# Patient Record
Sex: FEMALE | Race: WHITE
[De-identification: names, ages, dates, MRNs, and addresses within clinical notes are randomized per-mention and may not be internally consistent; named-entity substitution may affect disease eponyms.]

---

## 2017-10-07 ENCOUNTER — HOSPITAL ENCOUNTER (EMERGENCY)
Dept: HOSPITAL 76 - ED | Age: 32
Discharge: HOME | End: 2017-10-07
Payer: COMMERCIAL

## 2017-10-07 VITALS — SYSTOLIC BLOOD PRESSURE: 128 MMHG | DIASTOLIC BLOOD PRESSURE: 70 MMHG

## 2017-10-07 DIAGNOSIS — R07.2: Primary | ICD-10-CM

## 2017-10-07 LAB
ALBUMIN/GLOB SERPL: 1.4 {RATIO} (ref 1–2.2)
ANION GAP SERPL CALCULATED.4IONS-SCNC: 9 MMOL/L (ref 6–13)
BASOPHILS NFR BLD AUTO: 0 10^3/UL (ref 0–0.1)
BASOPHILS NFR BLD AUTO: 0.5 %
BILIRUB BLD-MCNC: 0.4 MG/DL (ref 0.2–1)
BUN SERPL-MCNC: 15 MG/DL (ref 6–20)
CALCIUM UR-MCNC: 8.8 MG/DL (ref 8.5–10.3)
CHLORIDE SERPL-SCNC: 105 MMOL/L (ref 101–111)
CO2 SERPL-SCNC: 25 MMOL/L (ref 21–32)
CREAT SERPLBLD-SCNC: 0.8 MG/DL (ref 0.4–1)
EOSINOPHIL # BLD AUTO: 0.2 10^3/UL (ref 0–0.7)
EOSINOPHIL NFR BLD AUTO: 3.5 %
ERYTHROCYTE [DISTWIDTH] IN BLOOD BY AUTOMATED COUNT: 14.2 % (ref 12–15)
GFRSERPLBLD MDRD-ARVRAT: 85 ML/MIN/{1.73_M2} (ref 89–?)
GLOBULIN SER-MCNC: 2.9 G/DL (ref 2.1–4.2)
GLUCOSE SERPL-MCNC: 111 MG/DL (ref 70–100)
HCT VFR BLD AUTO: 44.4 % (ref 37–47)
HGB UR QL STRIP: 14.5 G/DL (ref 12–16)
LIPASE SERPL-CCNC: 23 U/L (ref 22–51)
LYMPHOCYTES # SPEC AUTO: 2.6 10^3/UL (ref 1.5–3.5)
LYMPHOCYTES NFR BLD AUTO: 46.6 %
MCH RBC QN AUTO: 25.4 PG (ref 27–31)
MCHC RBC AUTO-ENTMCNC: 32.7 G/DL (ref 32–36)
MCV RBC AUTO: 77.6 FL (ref 81–99)
MONOCYTES # BLD AUTO: 0.5 10^3/UL (ref 0–1)
MONOCYTES NFR BLD AUTO: 8.3 %
NEUTROPHILS # BLD AUTO: 2.3 10^3/UL (ref 1.5–6.6)
NEUTROPHILS # SNV AUTO: 5.6 X10^3/UL (ref 4.8–10.8)
NEUTROPHILS NFR BLD AUTO: 41.1 %
NRBC # BLD AUTO: 0 /100WBC
PDW BLD AUTO: 8.8 FL (ref 7.9–10.8)
POTASSIUM SERPL-SCNC: 4.2 MMOL/L (ref 3.5–5)
PROT SPEC-MCNC: 7 G/DL (ref 6.7–8.2)
RBC MAR: 5.72 10^6/UL (ref 4.2–5.4)
SODIUM SERPLBLD-SCNC: 139 MMOL/L (ref 135–145)
WBC # BLD: 5.6 X10^3/UL

## 2017-10-07 PROCEDURE — 93005 ELECTROCARDIOGRAM TRACING: CPT

## 2017-10-07 PROCEDURE — 71275 CT ANGIOGRAPHY CHEST: CPT

## 2017-10-07 PROCEDURE — 99284 EMERGENCY DEPT VISIT MOD MDM: CPT

## 2017-10-07 PROCEDURE — 83690 ASSAY OF LIPASE: CPT

## 2017-10-07 PROCEDURE — 36415 COLL VENOUS BLD VENIPUNCTURE: CPT

## 2017-10-07 PROCEDURE — 85025 COMPLETE CBC W/AUTO DIFF WBC: CPT

## 2017-10-07 PROCEDURE — 80053 COMPREHEN METABOLIC PANEL: CPT

## 2017-10-07 PROCEDURE — 71020: CPT

## 2017-10-07 PROCEDURE — 84484 ASSAY OF TROPONIN QUANT: CPT

## 2017-10-07 PROCEDURE — 96374 THER/PROPH/DIAG INJ IV PUSH: CPT

## 2017-10-07 NOTE — XRAY REPORT
EXAM:

CHEST RADIOGRAPHY

 

EXAM DATE: 10/7/2017 02:52 PM.

 

CLINICAL HISTORY: Substernal chest pain today.

 

COMPARISON: None.

 

TECHNIQUE: 2 views.

 

FINDINGS: 

Lungs/Pleura: No focal opacities evident. No pleural effusion. No pneumothorax. Normal volumes.

 

Mediastinum: Heart and mediastinal contours are unremarkable.

 

Other: None.

 

IMPRESSION: Normal 2-view chest radiography.

 

[facility]

Referring Provider Line: [phone]

 

SITE ID: [id number]

## 2017-10-07 NOTE — CT REPORT
<H1.> EXAM:

CT ANGIOGRAM CHEST AND ABDOMEN

 

EXAM DATE: 10/7/2017 03:46 PM.

 

CLINICAL HISTORY: Substernal chest pain, severe.

 

COMPARISONS: None.

 

TECHNIQUE: 

Routine axial helical CT angiographic imaging was performed through the chest and abdomen. IV Contras
t: 80 cc of Isovue 300. Reconstructions: Coronal, sagittal, and 3D MIP reconstructions of the aorta.

 

In accordance with CT protocol optimization, one or more of the following dose reduction techniques w
ere utilized for this exam: automated exposure control, adjustment of mA and/or KV based on patient s
ize, or use of iterative reconstructive technique.

 

FINDINGS: 

Vascular Structures: Normal. No aneurysm, dissection, or significant atherosclerotic disease of the t
horacic aorta, abdominal aorta, or proximal iliac arteries. The visualized pulmonary, mesenteric, and
 solid organ vascular structures are also within normal limits.

 

Lungs/Pleura: No consolidation, nodules, or edema. No effusions or pneumothorax.

 

Mediastinum: Normal. No cardiac enlargement or adenopathy.

 

Abdominal Organs: Normal. The liver, spleen, pancreas, adrenal glands, gallbladder and kidneys are no
rmal in size and demonstrate no masses or abnormal enhancement.

 

Peritoneal Cavity: Normal. No free fluid, free air, or acute inflammatory process.

 

Bones: No significant abnormality.

 

Other: None.

 

IMPRESSION: 

Normal chest and abdomen CT angiogram. No aneurysm or dissection.

 

[facility]

Referring Provider Line: [phone]

 

SITE ID: [id number]

## 2017-10-07 NOTE — XRAY PRELIMINARY REPORT
Accession: [id number]

Exam: XR Chest 2 View PA/LAT

 

IMPRESSION: Normal 2-view chest radiography.

 

[facility]

 

SITE ID: [id number]

## 2017-10-07 NOTE — ED PHYSICIAN DOCUMENTATION
PD HPI CHEST PAIN





- Stated complaint


Stated Complaint: CHEST PX, VOMITTING





- Chief complaint


Chief Complaint: Cardiac





- History obtained from


History obtained from: Patient





- History of Present Illness


Timing - onset: Today


Timing - onset during: Light activity


Timing - details: Abrupt onset, Still present, Still present in ED


Quality: Sharp, Tearing, Pain


Location: Substernal


Radiation: Back


Improved by: No: Rest


Worsened by: Inspiration, Movement.  No: Eating, Palpation, Position


Associated symptoms: Feeling faint / dizzy.  No: Shortness of air, Nausea, 

Vomiting, Palpitations, Cough


Similar symptoms before: Has not had sx before


Recently seen: Not recently seen





Review of Systems


Constitutional: denies: Fever, Chills


Nose: denies: Rhinorrhea / runny nose, Congestion


Throat: denies: Sore throat


Cardiac: denies: Palpitations, Pedal edema, Calf pain


Respiratory: denies: Cough


GI: denies: Abdominal Pain, Nausea, Vomiting, Diarrhea


: denies: Dysuria, Frequency


Skin: denies: Rash, Lesions


Musculoskeletal: denies: Joint pain, Extremity swelling


Neurologic: denies: Near syncope, Syncope, Altered mental status





PD PAST MEDICAL HISTORY





- Past Medical History


Cardiovascular: None


Respiratory: None


Neuro: None


Endocrine/Autoimmune: None


GI: GERD


GYN: Miscarriage(s)


: Chronic bladder infection


HEENT: None


Psych: None


Musculoskeletal: None


Derm: None





- Past Surgical History


Past Surgical History: Yes


/GYN:  section





- Present Medications


Home Medications: 


 Ambulatory Orders











 Medication  Instructions  Recorded  Confirmed


 


Famotidine [Pepcid] 20 mg PO ONCE #20 tablet 10/07/17 


 


Multivitamin [Multivitamins] 1 cap PO DAILY 10/07/17 10/07/17


 


Naproxen [Naprosyn] 500 mg PO BID #15 tablet 10/07/17 


 


Oxycodone HCl/Acetaminophen 1 each PO Q6H PRN #12 tablet 10/07/17 





[Percocet 5-325 mg Tablet]   














- Allergies


Allergies/Adverse Reactions: 


 Allergies











Allergy/AdvReac Type Severity Reaction Status Date / Time


 


clarithromycin [From Biaxin] Allergy Severe Itching Verified 10/07/17 13:54


 


erythromycin base Allergy Severe Rash Verified 10/07/17 13:54





[Erythromycin Base]     


 


hydrocodone bitartrate * Allergy Severe Itching Verified 10/07/17 13:54





[From Vicodin]     


 


levofloxacin [From Levaquin] Allergy Severe Rash Verified 10/07/17 13:54


 


Penicillins Allergy Severe Hives Verified 10/07/17 13:54


 


Sulfa (Sulfonamide Allergy Severe Itching Verified 10/07/17 13:54





Antibiotics)     














- Social History


Does the pt smoke?: No


Smoking Status: Never smoker


Does the pt drink ETOH?: Yes


Does the pt have substance abuse?: No





- Family History


Family history: denies: Venous thromboembolism, Aortic aneursym, Aortic 

dissection





- Immunizations


Immunizations are current?: Yes





- POLST


Patient has POLST: No





PD ED PE NORMAL





- Vitals


Vital signs reviewed: Yes





- General


General: Alert and oriented X 3, Well developed/nourished, Other (appears in 

significant pain, with intermittent stabbinb pain substernally.)





- HEENT


HEENT: Moist mucous membranes, Pharynx benign





- Neck


Neck: Supple, no meningeal sign, No adenopathy





- Cardiac


Cardiac: RRR, No murmur





- Respiratory


Respiratory: No respiratory distress, Clear bilaterally





- Abdomen


Abdomen: Normal bowel sounds, Soft, Non tender, Non distended





- Back


Back: No CVA TTP





- Derm


Derm: Normal color, Warm and dry





- Extremities


Extremities: No tenderness to palpate, Normal ROM s pain, No edema, No calf 

tenderness / cord





- Neuro


Neuro: Alert and oriented X 3, No motor deficit, Normal speech





- Psych


Psych: Normal mood, Normal affect





Results





- Vitals


Vitals: 


 Vital Signs - 24 hr











  10/07/17 10/07/17 10/07/17





  13:50 15:23 16:30


 


Temperature 36.5 C 36.5 C 36.4 C L


 


Heart Rate 97 74 76


 


Respiratory 20 16 12





Rate   


 


Blood Pressure 132/80 H 109/71 123/78


 


O2 Saturation 99 98 98














  10/07/17 10/07/17





  16:38 17:20


 


Temperature 36.3 C L 


 


Heart Rate 73 75


 


Respiratory 16 16





Rate  


 


Blood Pressure 122/67 128/70


 


O2 Saturation 100 98








 Oxygen











O2 Source                      Room air

















- Labs


Labs: 


 Laboratory Tests











  10/07/17 10/07/17 10/07/17





  15:11 15:11 15:11


 


WBC  5.6  


 


RBC  5.72 H  


 


Hgb  14.5  


 


Hct  44.4  


 


MCV  77.6 L  


 


MCH  25.4 L  


 


MCHC  32.7  


 


RDW  14.2  


 


Plt Count  169  


 


MPV  8.8  


 


Neut #  2.3  


 


Lymph #  2.6  


 


Mono #  0.5  


 


Eos #  0.2  


 


Baso #  0.0  


 


Absolute Nucleated RBC  0.00  


 


Nucleated RBC %  0.0  


 


Sodium   139 


 


Potassium   4.2 


 


Chloride   105 


 


Carbon Dioxide   25 


 


Anion Gap   9.0 


 


BUN   15 


 


Creatinine   0.8 


 


Estimated GFR (MDRD)   85 L 


 


Glucose   111 H 


 


Calcium   8.8 


 


Total Bilirubin   0.4 


 


AST   18 


 


ALT   24 


 


Alkaline Phosphatase   64 


 


Troponin I    < 0.04


 


Total Protein   7.0 


 


Albumin   4.1 


 


Globulin   2.9 


 


Albumin/Globulin Ratio   1.4 


 


Lipase   23 














- Rads (name of study)


  ** chest xray


Radiology: Prelim report reviewed (normal)





  ** chest CT-A


Radiology: Prelim report reviewed (negative)





PD MEDICAL DECISION MAKING





- ED course


Complexity details: considered differential (such severe substernal pain, sharp 

and stabbing, not improved with GI cocktail. Normal ECG and CXR. Concern for 

vascular such as aortic dissection, that got CT angio chest, which is read as 

normal by Radiologist. ), d/w patient





Departure





- Departure


Disposition: 01 Home, Self Care


Clinical Impression: 


 Substernal chest pain





Condition: Stable


Record reviewed to determine appropriate education?: Yes


Instructions:  ED Chest Pain Atypical Unkn Cause


Follow-Up: 


ALDO GOINS MD [Primary Care Provider] - 


Prescriptions: 


Famotidine [Pepcid] 20 mg PO ONCE #20 tablet


Naproxen [Naprosyn] 500 mg PO BID #15 tablet


Oxycodone HCl/Acetaminophen [Percocet 5-325 mg Tablet] 1 each PO Q6H PRN #12 

tablet


 PRN Reason: Pain


Comments: 


Drink lots of fluids.  I do not know the cause of the pain exactly.  I presume 

its inflammatory given the better response to Toradol which is an anti-

inflammatory than it did to the antacid.  However he would still do some acid 

reducing medicine for the stomach in case and suggest famotidine daily for 

couple weeks.  Additionally I would use some ibuprofen or naproxen twice daily 

for the next week for inflammation.  Add Tylenol for mild pain.  Add Percocet 

if needed for worse pain but very short-term only.  Recheck if not entirely 

improved over the next 3-4 days.  Return sooner if worse or other symptoms.


Discharge Date/Time: 10/07/17 17:22

## 2017-10-07 NOTE — CT PRELIMINARY REPORT
Accession: [id number]

Exam: CT Chest Angio (AORTA)

 

IMPRESSION: 

Normal chest and abdomen CT angiogram. No aneurysm or dissection.

 

[facility]

 

SITE ID: [id number]

## 2019-03-06 ENCOUNTER — HOSPITAL ENCOUNTER (EMERGENCY)
Dept: HOSPITAL 76 - ED | Age: 34
Discharge: HOME | End: 2019-03-06
Payer: COMMERCIAL

## 2019-03-06 VITALS — SYSTOLIC BLOOD PRESSURE: 140 MMHG | DIASTOLIC BLOOD PRESSURE: 88 MMHG

## 2019-03-06 DIAGNOSIS — J38.3: ICD-10-CM

## 2019-03-06 DIAGNOSIS — R03.0: ICD-10-CM

## 2019-03-06 DIAGNOSIS — G43.111: Primary | ICD-10-CM

## 2019-03-06 LAB
ALBUMIN DIAFP-MCNC: 3.9 G/DL (ref 3.2–5.5)
ALBUMIN/GLOB SERPL: 1.3 {RATIO} (ref 1–2.2)
ALP SERPL-CCNC: 69 IU/L (ref 42–121)
ALT SERPL W P-5'-P-CCNC: 25 IU/L (ref 10–60)
ANION GAP SERPL CALCULATED.4IONS-SCNC: 7 MMOL/L (ref 6–13)
AST SERPL W P-5'-P-CCNC: 16 IU/L (ref 10–42)
BASOPHILS NFR BLD AUTO: 0 10^3/UL (ref 0–0.1)
BASOPHILS NFR BLD AUTO: 0.6 %
BILIRUB BLD-MCNC: 0.4 MG/DL (ref 0.2–1)
BUN SERPL-MCNC: 12 MG/DL (ref 6–20)
CALCIUM UR-MCNC: 8.6 MG/DL (ref 8.5–10.3)
CHLORIDE SERPL-SCNC: 105 MMOL/L (ref 101–111)
CO2 SERPL-SCNC: 25 MMOL/L (ref 21–32)
CREAT SERPLBLD-SCNC: 0.8 MG/DL (ref 0.4–1)
EOSINOPHIL # BLD AUTO: 0.3 10^3/UL (ref 0–0.7)
EOSINOPHIL NFR BLD AUTO: 4.2 %
ERYTHROCYTE [DISTWIDTH] IN BLOOD BY AUTOMATED COUNT: 14.7 % (ref 12–15)
GFRSERPLBLD MDRD-ARVRAT: 84 ML/MIN/{1.73_M2} (ref 89–?)
GLOBULIN SER-MCNC: 3 G/DL (ref 2.1–4.2)
GLUCOSE SERPL-MCNC: 98 MG/DL (ref 70–100)
HGB UR QL STRIP: 14.2 G/DL (ref 12–16)
LIPASE SERPL-CCNC: 28 U/L (ref 22–51)
LYMPHOCYTES # SPEC AUTO: 2.7 10^3/UL (ref 1.5–3.5)
LYMPHOCYTES NFR BLD AUTO: 45.9 %
MCH RBC QN AUTO: 25.6 PG (ref 27–31)
MCHC RBC AUTO-ENTMCNC: 32.8 G/DL (ref 32–36)
MCV RBC AUTO: 77.9 FL (ref 81–99)
MONOCYTES # BLD AUTO: 0.7 10^3/UL (ref 0–1)
MONOCYTES NFR BLD AUTO: 11.7 %
NEUTROPHILS # BLD AUTO: 2.2 10^3/UL (ref 1.5–6.6)
NEUTROPHILS # SNV AUTO: 5.9 X10^3/UL (ref 4.8–10.8)
NEUTROPHILS NFR BLD AUTO: 37.6 %
PDW BLD AUTO: 8.9 FL (ref 7.9–10.8)
PLATELET # BLD: 206 10^3/UL (ref 130–450)
PROT SPEC-MCNC: 6.9 G/DL (ref 6.7–8.2)
RBC MAR: 5.55 10^6/UL (ref 4.2–5.4)
SODIUM SERPLBLD-SCNC: 137 MMOL/L (ref 135–145)

## 2019-03-06 PROCEDURE — 83690 ASSAY OF LIPASE: CPT

## 2019-03-06 PROCEDURE — 80053 COMPREHEN METABOLIC PANEL: CPT

## 2019-03-06 PROCEDURE — 96374 THER/PROPH/DIAG INJ IV PUSH: CPT

## 2019-03-06 PROCEDURE — 36415 COLL VENOUS BLD VENIPUNCTURE: CPT

## 2019-03-06 PROCEDURE — 99283 EMERGENCY DEPT VISIT LOW MDM: CPT

## 2019-03-06 PROCEDURE — 85025 COMPLETE CBC W/AUTO DIFF WBC: CPT

## 2019-03-06 PROCEDURE — 84484 ASSAY OF TROPONIN QUANT: CPT

## 2019-03-06 PROCEDURE — 93005 ELECTROCARDIOGRAM TRACING: CPT

## 2019-03-06 NOTE — ED PHYSICIAN DOCUMENTATION
PD HPI FOCAL NEURO





- Stated complaint


Stated Complaint: HEADACHE/SOA/LT ARM PX





- Chief complaint


Chief Complaint: Neuro





- History obtained from


History obtained from: Patient





- History of Present Illness


Timing - onset: Other (This is a 33-year-old woman with history of anxiety and 

depression.  She recently stopped Cymbalta about a week and a half ago because 

of side effects.  She denies suicidal or homicidal ideation but presents today 

with multiple complaints including chronic migraine headache with aura since 

yesterday seeing black spots in her eyes.  She also has increased chest and neck

pain similar to prior episodes of vocal cord dysfunction.  She denies any 

possibility of pregnancy.  No alcohol or drug use.  She is active duty in the 

[employer].)





Review of Systems


Constitutional: denies: Fever, Chills


Throat: denies: Dental pain / toothache, Sore throat


Cardiac: reports: Chest pain / pressure.  denies: Palpitations, Pedal edema, 

Calf pain


Respiratory: denies: Dyspnea





PD PAST MEDICAL HISTORY





- Past Medical History


Cardiovascular: None


Respiratory: None


Endocrine/Autoimmune: None


GI: GERD


GYN: Miscarriage(s)


: Chronic bladder infection


HEENT: None


Psych: None, Depression, Anxiety


Musculoskeletal: None


Derm: None





- Past Surgical History


Past Surgical History: Yes


/GYN:  section





- Present Medications


Home Medications: 


                                Ambulatory Orders











 Medication  Instructions  Recorded  Confirmed


 


Famotidine [Pepcid] 20 mg PO ONCE #20 tablet 10/07/17 


 


Multivitamin [Multivitamins] 1 cap PO DAILY 10/07/17 10/07/17


 


Naproxen [Naprosyn] 500 mg PO BID #15 tablet 10/07/17 


 


Oxycodone HCl/Acetaminophen 1 each PO Q6H PRN #12 tablet 10/07/17 





[Percocet 5-325 mg Tablet]   


 


Amitriptyline [Elavil] 25 mg PO HS #7 tablet 19 














- Allergies


Allergies/Adverse Reactions: 


                                    Allergies











Allergy/AdvReac Type Severity Reaction Status Date / Time


 


clarithromycin [From Biaxin] Allergy Severe Itching Verified 19 18:17


 


erythromycin base Allergy Severe Rash Verified 19 18:17





[Erythromycin Base]     


 


hydrocodone bitartrate * Allergy Severe Itching Verified 19 18:17





[From Vicodin]     


 


levofloxacin [From Levaquin] Allergy Severe Rash Verified 19 18:17


 


Penicillins Allergy Severe Hives Verified 19 18:17


 


Sulfa (Sulfonamide Allergy Severe Itching Verified 19 18:17





Antibiotics)     














- Social History


Does the pt smoke?: No


Smoking Status: Never smoker


Does the pt drink ETOH?: Yes


Does the pt have substance abuse?: No





- Immunizations


Immunizations are current?: Yes





- POLST


Patient has POLST: No





PD ED PE NORMAL





- Vitals


Vital signs reviewed: Yes





- General


General: Alert and oriented X 3, No acute distress, Other (She is tearful at 

times)





- HEENT


HEENT: PERRL, EOMI





- Neck


Neck: Supple, no meningeal sign, No bony TTP





- Cardiac


Cardiac: RRR, No murmur, Other (Tender anterior sternum and chest wall)





- Respiratory


Respiratory: No respiratory distress, Clear bilaterally





- Abdomen


Abdomen: Normal bowel sounds, Soft, Non tender





- Back


Back: No CVA TTP, No spinal TTP





- Derm


Derm: Normal color, Warm and dry





- Extremities


Extremities: No edema, No calf tenderness / cord





- Neuro


Neuro: Alert and oriented X 3, Normal speech





Results





- Vitals


Vitals: 


                               Vital Signs - 24 hr











  19





  18:13


 


Temperature 36.6 C


 


Heart Rate 73


 


Respiratory 14





Rate 


 


Blood Pressure 140/88 H


 


O2 Saturation 100








                                     Oxygen











O2 Source                      Room air

















- EKG (time done)


  ** 1829


Rate: Rate (enter#) (83)


Rhythm: NSR


Axis: Normal


Intervals: Normal OH


QRS: Normal


Ischemia: ST elevation c/w repol


Computer interpretation: Agree with computer





- Labs


Labs: 


                                Laboratory Tests











  19





  19:08 19:08 19:08


 


WBC  5.9  


 


RBC  5.55 H  


 


Hgb  14.2  


 


Hct  43.2  


 


MCV  77.9 L  


 


MCH  25.6 L  


 


MCHC  32.8  


 


RDW  14.7  


 


Plt Count  206  


 


MPV  8.9  


 


Neut # (Auto)  2.2  


 


Lymph # (Auto)  2.7  


 


Mono # (Auto)  0.7  


 


Eos # (Auto)  0.3  


 


Baso # (Auto)  0.0  


 


Absolute Nucleated RBC  0.00  


 


Nucleated RBC %  0.0  


 


Sodium   137 


 


Potassium   3.6 


 


Chloride   105 


 


Carbon Dioxide   25 


 


Anion Gap   7.0 


 


BUN   12 


 


Creatinine   0.8 


 


Estimated GFR (MDRD)   84 L 


 


Glucose   98 


 


Calcium   8.6 


 


Total Bilirubin   0.4 


 


AST   16 


 


ALT   25 


 


Alkaline Phosphatase   69 


 


Troponin I    < 0.04


 


Total Protein   6.9 


 


Albumin   3.9 


 


Globulin   3.0 


 


Albumin/Globulin Ratio   1.3 


 


Lipase   28 














PD MEDICAL DECISION MAKING





- ED course


ED course: 





33-year-old woman presents with status migrainosus and vocal cord dysfunction 

for which she was treated with Toradol Reglan and Ativan with excellent relief. 

Diagnostics were negative.  We will trial a low-dose of amitriptyline at night, 

a limited prescription and she will follow-up with her doctor to report if it is

helping with her migraine frequency and/or depression.





Departure





- Departure


Disposition:  Home, Self Care


Clinical Impression: 


Migraine


Qualifiers:


 Migraine type: with aura Status migrainosus presence: with status migrainosus 

Intractability: intractable Qualified Code(s): G43.111 - Migraine with aura, 

intractable, with status migrainosus





Condition: Good


Record reviewed to determine appropriate education?: Yes


Instructions:  ED Headache Migraine


Prescriptions: 


Amitriptyline [Elavil] 25 mg PO HS #7 tablet


Comments: 


The amitriptyline should help with both depression and migraine frequency.  

Report to your doctor early next week if it is helpful for refill.





Your blood pressure was elevated today on check into the emergency department.  

This does not mean that you have hypertension, it is a common phenomenon to come

 to the emergency department and have elevated blood pressure.  I recommend that

 you see your primary care physician within the week to have it rechecked when 

you are feeling better.

## 2020-02-13 ENCOUNTER — HOSPITAL ENCOUNTER (EMERGENCY)
Dept: HOSPITAL 76 - ED | Age: 35
Discharge: HOME | End: 2020-02-13
Payer: COMMERCIAL

## 2020-02-13 VITALS — DIASTOLIC BLOOD PRESSURE: 71 MMHG | SYSTOLIC BLOOD PRESSURE: 119 MMHG

## 2020-02-13 DIAGNOSIS — H66.001: ICD-10-CM

## 2020-02-13 DIAGNOSIS — R20.3: Primary | ICD-10-CM

## 2020-02-13 PROCEDURE — 99284 EMERGENCY DEPT VISIT MOD MDM: CPT

## 2020-02-13 PROCEDURE — 99283 EMERGENCY DEPT VISIT LOW MDM: CPT

## 2020-02-13 PROCEDURE — 96372 THER/PROPH/DIAG INJ SC/IM: CPT

## 2020-02-13 NOTE — ED PHYSICIAN DOCUMENTATION
History of Present Illness





- Stated complaint


Stated Complaint: AB/RIB PX





- Chief complaint


Chief Complaint: Trauma Ext





- History obtained from


History obtained from: Patient





- History of Present Illness


Timing: How many weeks ago (8)





- Additonal information


Additional information: 





32-year-old female with a history of anxiety depression arthritis and migraine h

veda has generalized pain and pain in her ribs the pain down her left arm 

and left neck.  She is wearing a sling for the left arm.  She has had 

manipulations of her ribs previously done which has previously helped this last 

time it did not seem to help and she is complaining of pain in her anterior 

right ribs pain in her hands pain in her hips pain over her right upper quadrant

of her abdomen pain in her neck and back.  She has had some congestion and a 

high-pitched noise in her ears as well as some dizziness.She has had low-grade 

fever and cough.





Review of Systems


Constitutional: reports: Fever


Eyes: denies: Decreased vision


Ears: reports: Ear pain, Tinnitus/ringing


Nose: reports: Congestion


Throat: denies: Sore throat


Cardiac: reports: Chest pain / pressure.  denies: Palpitations, Pedal edema, 

Calf pain


Respiratory: reports: Cough.  denies: Dyspnea


GI: reports: Nausea.  denies: Vomiting


: denies: Dysuria, Frequency





PD PAST MEDICAL HISTORY





- Past Medical History


Cardiovascular: None


Respiratory: None


Endocrine/Autoimmune: None


GI: GERD


GYN: Miscarriage(s)


: Chronic bladder infection


HEENT: None


Psych: None, Depression, Anxiety


Musculoskeletal: None


Derm: None





- Past Surgical History


Past Surgical History: Yes


/GYN:  section





- Present Medications


Home Medications: 


                                Ambulatory Orders











 Medication  Instructions  Recorded  Confirmed


 


Famotidine [Pepcid] 20 mg PO ONCE #20 tablet 10/07/17 


 


Multivitamin [Multivitamins] 1 cap PO DAILY 10/07/17 10/07/17


 


Naproxen [Naprosyn] 500 mg PO BID #15 tablet 10/07/17 


 


Oxycodone HCl/Acetaminophen 1 each PO Q6H PRN #12 tablet 10/07/17 





[Percocet 5-325 mg Tablet]   


 


Amitriptyline [Elavil] 25 mg PO HS #7 tablet 19 


 


Cefdinir 300 mg PO BID #20 capsule 20 














- Allergies


Allergies/Adverse Reactions: 


                                    Allergies











Allergy/AdvReac Type Severity Reaction Status Date / Time


 


clarithromycin [From Biaxin] Allergy Severe Itching Verified 19 18:17


 


erythromycin base Allergy Severe Rash Verified 19 18:17





[Erythromycin Base]     


 


hydrocodone bitartrate * Allergy Severe Itching Verified 19 18:17





[From Vicodin]     


 


levofloxacin [From Levaquin] Allergy Severe Rash Verified 19 18:17


 


Penicillins Allergy Severe Hives Verified 19 18:17


 


Sulfa (Sulfonamide Allergy Severe Itching Verified 19 18:17





Antibiotics)     














- Social History


Does the pt smoke?: No


Smoking Status: Never smoker


Does the pt drink ETOH?: Yes


Does the pt have substance abuse?: No





- Immunizations


Immunizations are current?: Yes





- POLST


Patient has POLST: No





PD ED PE NORMAL





- Vitals


Vital signs reviewed: Yes (hypertensive mild )





- General


General: Alert and oriented X 3, No acute distress, Well developed/nourished





- HEENT


HEENT: Atraumatic, PERRL, EOMI, Pharynx benign, Other (right TM is inflamed 

angrily along the umbo the left is clear )





- Neck


Neck: Supple, no meningeal sign, No bony TTP





- Cardiac


Cardiac: RRR, No murmur





- Respiratory


Respiratory: No respiratory distress, Clear bilaterally, Other (There is 

tenderness to the anterior chest wall along the costosternal margin. )





- Abdomen


Abdomen: Soft, Other (Right upper quadrant tenderness worse with a deep breath.)





- Back


Back: No CVA TTP, No spinal TTP





- Derm


Derm: Normal color, No rash





- Extremities


Extremities: No deformity, No edema, No calf tenderness / cord





- Neuro


Neuro: Alert and oriented X 3, CNs 2-12 intact, No motor deficit, No sensory 

deficit, Normal speech


Eye Opening: Spontaneous


Motor: Obeys Commands


Verbal: Oriented


GCS Score: 15





- Psych


Psych: Normal mood, Normal affect





Results





- Vitals


Vitals: 


                               Vital Signs - 24 hr











  20





  15:50


 


Temperature 37.1 C


 


Heart Rate 95


 


Respiratory 18





Rate 


 


Blood Pressure 135/71 H


 


O2 Saturation 97








                                     Oxygen











O2 Source                      Room air

















PD MEDICAL DECISION MAKING





- ED course


Complexity details: considered differential, d/w patient


ED course: 





34-year-old female with allodynia and some undulating respiratory illness.  She 

does have otitis on exam.  She is administered dexamethasone 10 mg orally 

Toradol 60 mg IM and we will treat her otitis with antibiotic as well as I 

suspect this may be contributing to her increased overall pain.  I do not have a

solution for the patient's allodynia otherwise. I have discussed with her 

potential follow up with rheumatology as her pain is wide spread, involves 

joints and is persistent. She will need a referral. 





Departure





- Departure


Disposition: 01 Home, Self Care


Clinical Impression: 


 Allodynia





Otitis media


Qualifiers:


 Otitis media type: suppurative Chronicity: acute Laterality: right Recurrence: 

non-recurrent Spontaneous tympanic membrane rupture: without spontaneous rupture

Qualified Code(s): H66.001 - Acute suppurative otitis media without spontaneous 

rupture of ear drum, right ear





Condition: Stable


Instructions:  ED Otitis Media Acute Adult, ED Joint Pain, ED Chest Pain 

Costochondritis


Follow-Up: 


LALITHA BLUNT MD [Primary Care Provider] - 


Prescriptions: 


Cefdinir 300 mg PO BID #20 capsule


Comments: 


Today your wide spread pain is concerning for a rheumatologic problem and a fo

llow up with a rheumatologist might be helpful.

## 2020-09-06 ENCOUNTER — HOSPITAL ENCOUNTER (EMERGENCY)
Dept: HOSPITAL 76 - ED | Age: 35
LOS: 1 days | Discharge: HOME | End: 2020-09-07
Payer: COMMERCIAL

## 2020-09-06 DIAGNOSIS — R07.9: Primary | ICD-10-CM

## 2020-09-06 LAB
ALBUMIN DIAFP-MCNC: 4.1 G/DL (ref 3.2–5.5)
ALBUMIN/GLOB SERPL: 1.6 {RATIO} (ref 1–2.2)
ALP SERPL-CCNC: 70 IU/L (ref 42–121)
ALT SERPL W P-5'-P-CCNC: 23 IU/L (ref 10–60)
ANION GAP SERPL CALCULATED.4IONS-SCNC: 7 MMOL/L (ref 6–13)
AST SERPL W P-5'-P-CCNC: 14 IU/L (ref 10–42)
BASOPHILS NFR BLD AUTO: 0 10^3/UL (ref 0–0.1)
BASOPHILS NFR BLD AUTO: 0.3 %
BILIRUB BLD-MCNC: 0.4 MG/DL (ref 0.2–1)
BUN SERPL-MCNC: 16 MG/DL (ref 6–20)
CALCIUM UR-MCNC: 8.8 MG/DL (ref 8.5–10.3)
CHLORIDE SERPL-SCNC: 103 MMOL/L (ref 101–111)
CO2 SERPL-SCNC: 25 MMOL/L (ref 21–32)
CREAT SERPLBLD-SCNC: 0.9 MG/DL (ref 0.4–1)
EOSINOPHIL # BLD AUTO: 0.4 10^3/UL (ref 0–0.7)
EOSINOPHIL NFR BLD AUTO: 4.5 %
ERYTHROCYTE [DISTWIDTH] IN BLOOD BY AUTOMATED COUNT: 14.4 % (ref 12–15)
GLOBULIN SER-MCNC: 2.6 G/DL (ref 2.1–4.2)
GLUCOSE SERPL-MCNC: 103 MG/DL (ref 70–100)
HGB UR QL STRIP: 13.8 G/DL (ref 12–16)
LIPASE SERPL-CCNC: 25 U/L (ref 22–51)
LYMPHOCYTES # SPEC AUTO: 3.7 10^3/UL (ref 1.5–3.5)
LYMPHOCYTES NFR BLD AUTO: 38.6 %
MCH RBC QN AUTO: 25.8 PG (ref 27–31)
MCHC RBC AUTO-ENTMCNC: 32.1 G/DL (ref 32–36)
MCV RBC AUTO: 80.5 FL (ref 81–99)
MONOCYTES # BLD AUTO: 0.8 10^3/UL (ref 0–1)
MONOCYTES NFR BLD AUTO: 7.9 %
NEUTROPHILS # BLD AUTO: 4.7 10^3/UL (ref 1.5–6.6)
NEUTROPHILS # SNV AUTO: 9.6 X10^3/UL (ref 4.8–10.8)
NEUTROPHILS NFR BLD AUTO: 48.3 %
PDW BLD AUTO: 10.9 FL (ref 7.9–10.8)
PLATELET # BLD: 241 10^3/UL (ref 130–450)
PROT SPEC-MCNC: 6.7 G/DL (ref 6.7–8.2)
RBC MAR: 5.34 10^6/UL (ref 4.2–5.4)
SODIUM SERPLBLD-SCNC: 135 MMOL/L (ref 135–145)

## 2020-09-06 PROCEDURE — 84484 ASSAY OF TROPONIN QUANT: CPT

## 2020-09-06 PROCEDURE — 83690 ASSAY OF LIPASE: CPT

## 2020-09-06 PROCEDURE — 93971 EXTREMITY STUDY: CPT

## 2020-09-06 PROCEDURE — 36415 COLL VENOUS BLD VENIPUNCTURE: CPT

## 2020-09-06 PROCEDURE — 85025 COMPLETE CBC W/AUTO DIFF WBC: CPT

## 2020-09-06 PROCEDURE — 93005 ELECTROCARDIOGRAM TRACING: CPT

## 2020-09-06 PROCEDURE — 80053 COMPREHEN METABOLIC PANEL: CPT

## 2020-09-06 PROCEDURE — 96374 THER/PROPH/DIAG INJ IV PUSH: CPT

## 2020-09-06 PROCEDURE — 71275 CT ANGIOGRAPHY CHEST: CPT

## 2020-09-06 PROCEDURE — 99284 EMERGENCY DEPT VISIT MOD MDM: CPT

## 2020-09-06 NOTE — ED PHYSICIAN DOCUMENTATION
PD HPI DYSPNEA





- Stated complaint


Stated Complaint: SOA, FOOT SWELLING





- Chief complaint


Chief Complaint: Cardiac





- History obtained from


History obtained from: Patient





- History of Present Illness


Timing - onset: How many days ago (3)


Timing - details: Gradual onset, Constant, Waxing and waning


Pain level now: 3


Improved by: Rest


Worsened by: Exertion


Associated symptoms: Chest pain / discomfort, Unilateral edema.  No: Fever, 

Cough, Hemoptysis, Wheezing, Palpitations, Diaphoresis, Bilateral edema


Similar symptoms before: Has not had sx before


Recently seen: Not recently seen





- Additional information


Additional information: 





c/o 3 days of chest pressure/tightness with intermittent component of stabbing 

pain, associated with dyspnea. She also notes LLE swelling for several weeks.





Review of Systems


Constitutional: denies: Fever, Chills, Sweats


Cardiac: reports: Chest pain / pressure, Pedal edema.  denies: Palpitations, 

Calf pain


Respiratory: reports: Dyspnea.  denies: Cough, Hemoptysis, Wheezing


GI: reports: Reviewed and negative


: denies: Dysuria, Frequency, Now pregnant EGA


Musculoskeletal: reports: Extremity swelling.  denies: Extremity pain





PD PAST MEDICAL HISTORY





- Past Medical History


Cardiovascular: None


Respiratory: None


Endocrine/Autoimmune: None


GI: GERD


GYN: Miscarriage(s)


: Chronic bladder infection


HEENT: None


Psych: None, Depression, Anxiety


Musculoskeletal: None


Derm: None





- Past Surgical History


Past Surgical History: Yes


/GYN:  section





- Present Medications


Home Medications: 


                                Ambulatory Orders











 Medication  Instructions  Recorded  Confirmed


 


Famotidine [Pepcid] 20 mg PO ONCE #20 tablet 10/07/17 


 


Multivitamin [Multivitamins] 1 cap PO DAILY 10/07/17 10/07/17


 


Naproxen [Naprosyn] 500 mg PO BID #15 tablet 10/07/17 


 


Oxycodone HCl/Acetaminophen 1 each PO Q6H PRN #12 tablet 10/07/17 





[Percocet 5-325 mg Tablet]   


 


Amitriptyline [Elavil] 25 mg PO HS #7 tablet 19 


 


Cefdinir 300 mg PO BID #20 capsule 20 


 


traMADol [Ultram] 50 - 100 mg PO Q6H PRN #20 tablet 20 














- Allergies


Allergies/Adverse Reactions: 


                                    Allergies











Allergy/AdvReac Type Severity Reaction Status Date / Time


 


clarithromycin [From Biaxin] Allergy Severe Itching Verified 20 22:26


 


erythromycin base Allergy Severe Rash Verified 20 22:26





[Erythromycin Base]     


 


hydrocodone bitartrate * Allergy Severe Itching Verified 20 22:26





[From Vicodin]     


 


levofloxacin [From Levaquin] Allergy Severe Rash Verified 20 22:26


 


Penicillins Allergy Severe Hives Verified 20 22:26


 


Sulfa (Sulfonamide Allergy Severe Itching Verified 20 22:26





Antibiotics)     














- Social History


Does the pt smoke?: No


Smoking Status: Never smoker


Does the pt drink ETOH?: Yes


Does the pt have substance abuse?: No





- Immunizations


Immunizations are current?: Yes





- POLST


Patient has POLST: No





PD ED PE NORMAL





- Vitals


Vital signs reviewed: Yes





- General


General: Alert and oriented X 3, No acute distress, Well developed/nourished





- HEENT


HEENT: Moist mucous membranes





- Neck


Neck: Supple, no meningeal sign





- Cardiac


Cardiac: RRR, No murmur, No gallop, No rub





- Respiratory


Respiratory: No respiratory distress, Clear bilaterally





- Abdomen


Abdomen: Soft, Non tender





- Derm


Derm: Normal color, Warm and dry





- Extremities


Extremities: No edema, No calf tenderness / cord





Results





- Vitals


Vitals: 


                               Vital Signs - 24 hr











  20





  21:55 22:00 23:00


 


Temperature 37.2 C  


 


Heart Rate 84 77 78


 


Respiratory 16 17 24





Rate   


 


Blood Pressure 136/88 H 120/82 H 135/105 H


 


O2 Saturation 100 100 100














  20





  23:11 23:48 00:54


 


Temperature   


 


Heart Rate 81 90 87


 


Respiratory 20 25 H 25 H





Rate   


 


Blood Pressure 130/84 H 128/86 H 121/74


 


O2 Saturation 100 100 100














  20





  02:08 02:19


 


Temperature 36.9 C 37.1 C


 


Heart Rate 70 72


 


Respiratory 16 14





Rate  


 


Blood Pressure 122/84 H 122/84 H


 


O2 Saturation 100 99








                                     Oxygen











O2 Source                      Room air

















- EKG (time done)


  ** No standard instances


Rate: Rate (enter#) (77)


Rhythm: NSR


Axis: Normal


Intervals: Normal [place]


QRS: Normal


Ischemia: Normal ST segments





- Labs


Labs: 


                                Laboratory Tests











  20





  23:01 23:01 23:01


 


WBC  9.6  


 


RBC  5.34  


 


Hgb  13.8  


 


Hct  43.0  


 


MCV  80.5 L  


 


MCH  25.8 L  


 


MCHC  32.1  


 


RDW  14.4  


 


Plt Count  241  


 


MPV  10.9 H  


 


Neut # (Auto)  4.7  


 


Lymph # (Auto)  3.7 H  


 


Mono # (Auto)  0.8  


 


Eos # (Auto)  0.4  


 


Baso # (Auto)  0.0  


 


Absolute Nucleated RBC  0.00  


 


Nucleated RBC %  0.0  


 


Sodium   135 


 


Potassium   3.9 


 


Chloride   103 


 


Carbon Dioxide   25 


 


Anion Gap   7.0 


 


BUN   16 


 


Creatinine   0.9 


 


Estimated GFR (MDRD)   73 L 


 


Glucose   103 H 


 


Calcium   8.8 


 


Total Bilirubin   0.4 


 


AST   14 


 


ALT   23 


 


Alkaline Phosphatase   70 


 


Troponin I High Sens    < 2.3 L


 


Total Protein   6.7 


 


Albumin   4.1 


 


Globulin   2.6 


 


Albumin/Globulin Ratio   1.6 


 


Lipase   25 














- Rads (name of study)


  ** CT chest angio


Radiology: Prelim report reviewed, See rad report





  ** LLE doppler US


Radiology: Prelim report reviewed, See rad report





PD MEDICAL DECISION MAKING





- ED course


Complexity details: reviewed results, re-evaluated patient, considered 

differential, d/w patient





Departure





- Departure


Disposition: 01 Home, Self Care


Clinical Impression: 


 Chest pain





Condition: Good


Instructions:  ED Chest Pain Atypical Unkn Cause, ED Chest Pain Costochondritis,

ED Chest Pain Pleurisy


Follow-Up: 


Shyla Coppola ARNP [Primary Care Provider] - 


Prescriptions: 


traMADol [Ultram] 50 - 100 mg PO Q6H PRN #20 tablet


 PRN Reason: Pain


Discharge Date/Time: 20 02:22

## 2020-09-07 VITALS — SYSTOLIC BLOOD PRESSURE: 122 MMHG | DIASTOLIC BLOOD PRESSURE: 84 MMHG

## 2020-09-07 NOTE — CT REPORT
PROCEDURE:  ANGIO CHEST W/WO

 

INDICATIONS:  pleuritic CP, LLE swelling

 

CONTRAST:  IV CONTRAST: Optiray 320 ml: 100 PO CONTRAST: *NO PO CONTRAST 

 

TECHNIQUE:  

After the administration of intravenous contrast, 2 mm thick sections acquired from the pulmonary api
radha to the posterior costophrenic angles.  3-dimensional maximum intensity projection (MIP) coronal a
nd sagittal reformats were then acquired through the thorax. For radiation dose reduction, the follow
ing was used:  automated exposure control, adjustment of mA and/or kV according to patient size. 

 

COMPARISON:  Chest CT 10/07/2017. Correlation is also made with chest radiograph 10/07/2017. Correlat
ion is also made with the accompanying venous ultrasound 09/06/2020

 

FINDINGS:  

Image quality:  Excellent.  

 

Pulmonary arteries:  Pulmonary arteries are normal in size, and demonstrate no intraluminal filling d
efects to suggest central pulmonary embolism.  

 

Lungs and pleura:  Lungs are clear.  No pleural effusions or pneumothorax.  Central and peripheral ai
rways are patent.  

 

Mediastinum:  Heart size is normal, without pericardial effusion. A small amount of residual thymus t
issue can be seen within the anterior mediastinum, which is considered to be within normal limits for
 a patient of this age. No mediastinal or hilar adenopathy.  Thoracic aorta is normal in caliber and 
enhancement.  Esophagus is normal in caliber, without hiatal hernia.  

 

Bones and chest wall:  No suspicious bony lesions.  Ribs and thoracic spine appear intact throughout.
  The thyroid is normal.  No axillary or supraclavicular adenopathy.  

 

Abdomen:  Visualized upper abdominal solid organs appear normal in the early arterial phase of enhanc
ement.  

 

 

 

 

IMPRESSION:  

 

Negative for pulmonary embolism.

 

Normal study.

 

 

Note: No significant discrepancy from the preliminary report.

 

Reviewed by: Cricket Spangler MD on 9/7/2020 9:36 AM JOSE

Approved by: Cricket Spangler MD on 9/7/2020 9:36 AM JOSE

 

 

Station ID:  [id number]

## 2020-09-07 NOTE — ULTRASOUND REPORT
PROCEDURE:  Duplex Ext Veins Left

 

INDICATIONS:  LLE swelling

 

TECHNIQUE:  

Real-time imaging, as well as color and pulse Doppler interrogation, were performed of the lower extr
emity deep veins from the inguinal ligament to the popliteal fossa.  

 

COMPARISON:  Correlation is made with the subsequently performed chest CT 09/07/2020

 

FINDINGS:  The deep veins are normally compressible, and free of intraluminal thrombus.  Color and pu
lse Doppler demonstrate normal phasic intraluminal flow.  There is normal augmentation response to di
stal compression maneuver.  

 

 

 

IMPRESSION:  No findings of deep venous thrombosis are seen.

 

 

 

Note: No significant discrepancy from the preliminary report.

 

Reviewed by: Cricket Spangler MD on 9/7/2020 9:37 AM JOSE

Approved by: Cricket Spangler MD on 9/7/2020 9:37 AM JOSE

 

 

Station ID:  [id number]

## 2020-11-24 ENCOUNTER — HOSPITAL ENCOUNTER (EMERGENCY)
Dept: HOSPITAL 76 - ED | Age: 35
Discharge: HOME | End: 2020-11-24
Payer: COMMERCIAL

## 2020-11-24 VITALS — DIASTOLIC BLOOD PRESSURE: 74 MMHG | SYSTOLIC BLOOD PRESSURE: 145 MMHG

## 2020-11-24 DIAGNOSIS — F32.9: ICD-10-CM

## 2020-11-24 DIAGNOSIS — F41.9: ICD-10-CM

## 2020-11-24 DIAGNOSIS — R11.2: Primary | ICD-10-CM

## 2020-11-24 LAB
ALBUMIN DIAFP-MCNC: 4.4 G/DL (ref 3.2–5.5)
ALBUMIN/GLOB SERPL: 1.6 {RATIO} (ref 1–2.2)
ALP SERPL-CCNC: 77 IU/L (ref 42–121)
ALT SERPL W P-5'-P-CCNC: 27 IU/L (ref 10–60)
ANION GAP SERPL CALCULATED.4IONS-SCNC: 10 MMOL/L (ref 6–13)
AST SERPL W P-5'-P-CCNC: 15 IU/L (ref 10–42)
BASOPHILS NFR BLD AUTO: 0 10^3/UL (ref 0–0.1)
BASOPHILS NFR BLD AUTO: 0.3 %
BILIRUB BLD-MCNC: 0.7 MG/DL (ref 0.2–1)
BUN SERPL-MCNC: 12 MG/DL (ref 6–20)
CALCIUM UR-MCNC: 9.1 MG/DL (ref 8.5–10.3)
CHLORIDE SERPL-SCNC: 104 MMOL/L (ref 101–111)
CLARITY UR REFRACT.AUTO: CLEAR
CO2 SERPL-SCNC: 26 MMOL/L (ref 21–32)
CREAT SERPLBLD-SCNC: 0.9 MG/DL (ref 0.4–1)
EOSINOPHIL # BLD AUTO: 0.3 10^3/UL (ref 0–0.7)
EOSINOPHIL NFR BLD AUTO: 3.2 %
ERYTHROCYTE [DISTWIDTH] IN BLOOD BY AUTOMATED COUNT: 14.3 % (ref 12–15)
GLOBULIN SER-MCNC: 2.7 G/DL (ref 2.1–4.2)
GLUCOSE SERPL-MCNC: 98 MG/DL (ref 70–100)
GLUCOSE UR QL STRIP.AUTO: NEGATIVE MG/DL
HCG UR QL: NEGATIVE
HGB UR QL STRIP: 15.3 G/DL (ref 12–16)
KETONES UR QL STRIP.AUTO: NEGATIVE MG/DL
LIPASE SERPL-CCNC: 26 U/L (ref 22–51)
LYMPHOCYTES # SPEC AUTO: 3.7 10^3/UL (ref 1.5–3.5)
LYMPHOCYTES NFR BLD AUTO: 38 %
MCH RBC QN AUTO: 25.8 PG (ref 27–31)
MCHC RBC AUTO-ENTMCNC: 32 G/DL (ref 32–36)
MCV RBC AUTO: 80.5 FL (ref 81–99)
MONOCYTES # BLD AUTO: 0.7 10^3/UL (ref 0–1)
MONOCYTES NFR BLD AUTO: 7.2 %
NEUTROPHILS # BLD AUTO: 4.9 10^3/UL (ref 1.5–6.6)
NEUTROPHILS # SNV AUTO: 9.7 X10^3/UL (ref 4.8–10.8)
NEUTROPHILS NFR BLD AUTO: 51 %
NITRITE UR QL STRIP.AUTO: NEGATIVE
PDW BLD AUTO: 10.2 FL (ref 7.9–10.8)
PH UR STRIP.AUTO: 5.5 PH (ref 5–7.5)
PLATELET # BLD: 287 10^3/UL (ref 130–450)
PROT SPEC-MCNC: 7.1 G/DL (ref 6.7–8.2)
PROT UR STRIP.AUTO-MCNC: NEGATIVE MG/DL
RBC # UR STRIP.AUTO: (no result) /UL
RBC MAR: 5.94 10^6/UL (ref 4.2–5.4)
SODIUM SERPLBLD-SCNC: 140 MMOL/L (ref 135–145)
SP GR UR STRIP.AUTO: >=1.03 (ref 1–1.03)
UROBILINOGEN UR QL STRIP.AUTO: (no result) E.U./DL
UROBILINOGEN UR STRIP.AUTO-MCNC: NEGATIVE MG/DL

## 2020-11-24 PROCEDURE — 96374 THER/PROPH/DIAG INJ IV PUSH: CPT

## 2020-11-24 PROCEDURE — 84443 ASSAY THYROID STIM HORMONE: CPT

## 2020-11-24 PROCEDURE — 87086 URINE CULTURE/COLONY COUNT: CPT

## 2020-11-24 PROCEDURE — 83690 ASSAY OF LIPASE: CPT

## 2020-11-24 PROCEDURE — 80053 COMPREHEN METABOLIC PANEL: CPT

## 2020-11-24 PROCEDURE — 85025 COMPLETE CBC W/AUTO DIFF WBC: CPT

## 2020-11-24 PROCEDURE — 99284 EMERGENCY DEPT VISIT MOD MDM: CPT

## 2020-11-24 PROCEDURE — 81003 URINALYSIS AUTO W/O SCOPE: CPT

## 2020-11-24 PROCEDURE — 36415 COLL VENOUS BLD VENIPUNCTURE: CPT

## 2020-11-24 PROCEDURE — 81001 URINALYSIS AUTO W/SCOPE: CPT

## 2020-11-24 PROCEDURE — 81025 URINE PREGNANCY TEST: CPT

## 2020-11-24 NOTE — ED PHYSICIAN DOCUMENTATION
History of Present Illness





- Stated complaint


Stated Complaint: NAUSEA, VOMITING





- Chief complaint


Chief Complaint: General





- History obtained from


History obtained from: Patient





- Additonal information


Additional information: 


34-year-old female presents to the emergency department with 1 month of 

worsening nausea vomiting.  She reports that her symptoms began after her 

primary [facility] started her on venlafaxine.  Since then she

has had persistent nausea and the vomiting has been worse over the last 4 days. 

She has no fevers or focal abdominal tenderness.  She reports that she spoke 

with her [facility] doctors about this and they advised her to come to the emergency 

department.  She denies taking any other prescribed medications.  She denies any

dysuria fevers hematuria or melena.  She reports that with the vomiting she has 

been having more headaches which is also made her symptoms worse.








Review of Systems


Constitutional: denies: Fever, Chills


Eyes: reports: Reviewed and negative


Ears: reports: Reviewed and negative


Nose: reports: Reviewed and negative


Throat: reports: Reviewed and negative


Cardiac: denies: Chest pain / pressure, Palpitations


Respiratory: denies: Dyspnea, Cough, Hemoptysis


GI: reports: Nausea, Vomiting.  denies: Abdominal Pain, Abdominal Swelling


: denies: Dysuria, Frequency, Hesitancy


Skin: reports: Reviewed and negative


Musculoskeletal: reports: Reviewed and negative


Neurologic: reports: Reviewed and negative


Psychiatric: reports: Depressed.  denies: Suicidal, Hallucinations, Delusions





PD PAST MEDICAL HISTORY





- Past Medical History


Cardiovascular: None


Respiratory: None


Endocrine/Autoimmune: None


GI: GERD


GYN: Miscarriage(s)


: Chronic bladder infection


HEENT: None


Psych: None, Depression, Anxiety


Musculoskeletal: None


Derm: None





- Past Surgical History


Past Surgical History: Yes


/GYN:  section





- Present Medications


Home Medications: 


                                Ambulatory Orders











 Medication  Instructions  Recorded  Confirmed


 


Famotidine [Pepcid] 20 mg PO ONCE #20 tablet 10/07/17 


 


Multivitamin [Multivitamins] 1 cap PO DAILY 10/07/17 10/07/17


 


Naproxen [Naprosyn] 500 mg PO BID #15 tablet 10/07/17 


 


Oxycodone HCl/Acetaminophen 1 each PO Q6H PRN #12 tablet 10/07/17 





[Percocet 5-325 mg Tablet]   


 


Amitriptyline [Elavil] 25 mg PO HS #7 tablet 19 


 


Cefdinir 300 mg PO BID #20 capsule 20 


 


traMADol [Ultram] 50 - 100 mg PO Q6H PRN #20 tablet 20 


 


Prochlorperazine [Compazine] 5 mg PO BID #15 tablet 20 














- Allergies


Allergies/Adverse Reactions: 


                                    Allergies











Allergy/AdvReac Type Severity Reaction Status Date / Time


 


clarithromycin [From Biaxin] Allergy Severe Itching Verified 20 22:26


 


erythromycin base Allergy Severe Rash Verified 20 22:26





[Erythromycin Base]     


 


hydrocodone bitartrate * Allergy Severe Itching Verified 20 22:26





[From Vicodin]     


 


levofloxacin [From Levaquin] Allergy Severe Rash Verified 20 22:26


 


Penicillins Allergy Severe Hives Verified 20 22:26


 


Sulfa (Sulfonamide Allergy Severe Itching Verified 20 22:26





Antibiotics)     














- Social History


Does the pt smoke?: No


Smoking Status: Never smoker


Does the pt drink ETOH?: Yes


Does the pt have substance abuse?: No





- Immunizations


Immunizations are current?: Yes





- POLST


Patient has POLST: No





PD ED PE NORMAL





- General


General: Alert and oriented X 3, No acute distress





- HEENT


HEENT: PERRL, EOMI, Ears normal





- Neck


Neck: Supple, no meningeal sign, No adenopathy





- Cardiac


Cardiac: RRR, No murmur





- Respiratory


Respiratory: No respiratory distress, Clear bilaterally





Results





- Vitals


Vitals: 


                               Vital Signs - 24 hr











  20





  18:32


 


Temperature 36.6 C


 


Heart Rate 88


 


Respiratory 18





Rate 


 


Blood Pressure 127/67


 


O2 Saturation 97








                                     Oxygen











O2 Source                      Room air

















- Labs


Labs: 


                                Laboratory Tests











  20





  19:32 19:32 19:32


 


WBC  9.7  


 


RBC  5.94 H  


 


Hgb  15.3  


 


Hct  47.8 H  


 


MCV  80.5 L  


 


MCH  25.8 L  


 


MCHC  32.0  


 


RDW  14.3  


 


Plt Count  287  


 


MPV  10.2  


 


Neut # (Auto)  4.9  


 


Lymph # (Auto)  3.7 H  


 


Mono # (Auto)  0.7  


 


Eos # (Auto)  0.3  


 


Baso # (Auto)  0.0  


 


Absolute Nucleated RBC  0.00  


 


Nucleated RBC %  0.0  


 


Sodium   140 


 


Potassium   3.7 


 


Chloride   104 


 


Carbon Dioxide   26 


 


Anion Gap   10.0 


 


BUN   12 


 


Creatinine   0.9 


 


Estimated GFR (MDRD)   73 L 


 


Glucose   98 


 


Calcium   9.1 


 


Total Bilirubin   0.7 


 


AST   15 


 


ALT   27 


 


Alkaline Phosphatase   77 


 


Total Protein   7.1 


 


Albumin   4.4 


 


Globulin   2.7 


 


Albumin/Globulin Ratio   1.6 


 


Lipase   26 


 


TSH    1.16


 


Urine Color   


 


Urine Clarity   


 


Urine pH   


 


Ur Specific Gravity   


 


Urine Protein   


 


Urine Glucose (UA)   


 


Urine Ketones   


 


Urine Occult Blood   


 


Urine Nitrite   


 


Urine Bilirubin   


 


Urine Urobilinogen   


 


Ur Leukocyte Esterase   


 


Ur Microscopic Review   


 


Urine Culture Comments   


 


Urine HCG, Qual   














  20





  21:04


 


WBC 


 


RBC 


 


Hgb 


 


Hct 


 


MCV 


 


MCH 


 


MCHC 


 


RDW 


 


Plt Count 


 


MPV 


 


Neut # (Auto) 


 


Lymph # (Auto) 


 


Mono # (Auto) 


 


Eos # (Auto) 


 


Baso # (Auto) 


 


Absolute Nucleated RBC 


 


Nucleated RBC % 


 


Sodium 


 


Potassium 


 


Chloride 


 


Carbon Dioxide 


 


Anion Gap 


 


BUN 


 


Creatinine 


 


Estimated GFR (MDRD) 


 


Glucose 


 


Calcium 


 


Total Bilirubin 


 


AST 


 


ALT 


 


Alkaline Phosphatase 


 


Total Protein 


 


Albumin 


 


Globulin 


 


Albumin/Globulin Ratio 


 


Lipase 


 


TSH 


 


Urine Color  YELLOW


 


Urine Clarity  CLEAR


 


Urine pH  5.5


 


Ur Specific Gravity  >=1.030 H


 


Urine Protein  NEGATIVE


 


Urine Glucose (UA)  NEGATIVE


 


Urine Ketones  NEGATIVE


 


Urine Occult Blood  TRACE-INTA


 


Urine Nitrite  NEGATIVE


 


Urine Bilirubin  NEGATIVE


 


Urine Urobilinogen  0.2 (NORMAL)


 


Ur Leukocyte Esterase  NEGATIVE


 


Ur Microscopic Review  NOT INDICATED


 


Urine Culture Comments  NOT INDICATED


 


Urine HCG, Qual  NEGATIVE














PD MEDICAL DECISION MAKING





- ED course


Complexity details: reviewed results, re-evaluated patient, considered 

differential, d/w patient


ED course: 


34-year-old female presents the emergency department with 1 month of nausea and 

vomiting that has gotten worse over the last 2 to 3 days.  She associates this 

with her venlafaxine.  I considered serotonin syndrome.  However patient has no 

tachycardia or hypertension.  She is normothermic.  Her labs and electrolytes 

are otherwise unremarkable.  In the emergency department she was given 2 L of 

crystalloid as well as Compazine with marked improvement in her symptoms.  

Following the Compazine she was now tolerating oral liquids.  I discussed this 

case with her and she agrees that suddenly stopping the venlafaxine could be 

dangerous.  She will begin to try and taper back.  I have advised her to discuss

with her primary doctor alternative medications to help manage her anxiety and 

depression.  She easily contracts for safety and is not a risk to herself.








Departure





- Departure


Disposition: 01 Home, Self Care


Clinical Impression: 


Nausea & vomiting


Qualifiers:


 Vomiting type: unspecified Vomiting Intractability: non-intractable Qualified 

Code(s): R11.2 - Nausea with vomiting, unspecified





Condition: Stable


Record reviewed to determine appropriate education?: Yes


Follow-Up: 


GUTIERREZ MUNOZ MD [Primary Care Provider] - 


Prescriptions: 


Prochlorperazine [Compazine] 5 mg PO BID #15 tablet


Comments: 


Karma your labs today are essentially normal.  I am glad that you are feeling 

better after the IV fluids.  The persistent nausea and vomiting may be related 

to the venlafaxine.  I do not want you to stop taking this medication abruptly. 

 Please discuss with your primary doctor about tapering back and then starting a

 new medication to help manage your symptoms.





I have prescribed some Compazine and medication to help with nausea.  You may 

take twice daily as needed.





Return here if you have worsening symptoms, high fever or racing heart rate.